# Patient Record
Sex: MALE | Race: WHITE
[De-identification: names, ages, dates, MRNs, and addresses within clinical notes are randomized per-mention and may not be internally consistent; named-entity substitution may affect disease eponyms.]

---

## 2019-01-05 ENCOUNTER — HOSPITAL ENCOUNTER (EMERGENCY)
Dept: HOSPITAL 80 - FED | Age: 39
LOS: 1 days | Discharge: HOME | End: 2019-01-06
Payer: MEDICAID

## 2019-01-05 VITALS — DIASTOLIC BLOOD PRESSURE: 82 MMHG | SYSTOLIC BLOOD PRESSURE: 147 MMHG

## 2019-01-05 DIAGNOSIS — Z59.0: ICD-10-CM

## 2019-01-05 DIAGNOSIS — S90.821A: ICD-10-CM

## 2019-01-05 DIAGNOSIS — S90.822A: Primary | ICD-10-CM

## 2019-01-05 SDOH — ECONOMIC STABILITY - HOUSING INSECURITY: HOMELESSNESS: Z59.0

## 2019-01-05 NOTE — EDPHY
General


Time Seen by Provider: 01/05/19 23:37


Narrative: 





CLINICAL IMPRESSION:  Foot blisters


_________________


ASSESSMENT/PLAN:


 38-year-old homeless male presents to the emergency department complaining of 

by left knee pain x1 month.  Patient is a very poor historian.  He reports no 

injury or trauma.  He has no obvious open wounds.  Distal neurovascular is 

intact.  He has blisters to the bottom of both heels that he is concerned about 

infection.  No evidence of trench foot.  Patient is requesting an antibiotic.  

He is currently sleeping outside.  He is unable to answer my question regarding 

MRSA risk or previous staph infection.  No track marks to suggest underlying IV 

drug abuse and patient is denying this.  He was given Keflex tonight and a take-

home pack.  I encouraged him to follow up with primary care.  A fresh pair of 

socks was provided.  I advised him to let his feet air out during the day limit 

his walking.  Warning signs return to ED outlined and discharge


_________________


DIFFERENTIAL DX:


Differential includes but not limited to acute traumatic injury, infection, 

cellulitis, trench foot, blisters





_________________


CHIEF COMPLAINT:  My feet hurt


_________________


HPI:


38-year-old homeless male presents to the emergency department stating that 

both of his feet have been hurting for 1 month.  Patient reports he has been

"kicked out of the homeless shelter for at least 60 days".  He states that he 

is not doing his chores there but he will not report what this chore is.  He is 

concerned that he has an infected blister on the bottom of his foot.  He claims 

that he is changing his socks daily although his feet and shoes appear 

chronically wet.  Patient states he walks all night.  He does not want to go to 

a warming shelter tonight.  He denies fevers or chills.  There is no obvious 

signs of open wound or trauma. 


_________________


PAST MEDICAL HISTORY: 


Mental health history  See triage summary and nurse notes for addition 

applicable history 





Pertinent Past Surgical History: None reported 





Family History: Noncontributory 





Social History:  Homeless


_________________


REVIEW OF SYSTEMS:


A full 10 point review of systems was negative except for those mentioned in 

HPI. 


_________________


PHYSICAL EXAM:


General Appearance:  Alert, oriented, appropriate, cooperative, NAD, well 

hydrated, non-toxic appearing, VSS, no hypoxia.


Skin: Warm, dry, no rashes, no nodules on palpation.  Blisters noted to the 

bottom of both heels.


Musculoskeletal:  Full range of motion of both lower extremity and feet.  

Distal neurovascular exam intact.  No open wounds


_________________


MEDICAL DECISION MAKING:


Patient was seen independently. Secondary supervising physician at time of 

evaluation was:  Dr Geronimo.


Diagnosis:  Blisters to feet. New, requires workup


Summary: See Assessment and Plan for summary of ED visit 








Patient Progress:  Stable. 





- History


Smoking Status: Former smoker





- Objective


Vital Signs: 


 Initial Vital Signs











Temperature (C)  36.8 C   01/05/19 23:25


 


Heart Rate  89   01/05/19 23:25


 


Respiratory Rate  18   01/05/19 23:25


 


Blood Pressure  147/82 H  01/05/19 23:25


 


O2 Sat (%)  94   01/05/19 23:25








 











O2 Delivery Mode               Room Air














Allergies/Adverse Reactions: 


 





No Known Allergies Allergy (Verified 09/08/16 12:54)


 








Home Medications: 














 Medication  Instructions  Recorded


 


Unobtainable  01/05/19











Medications Given: 


 








Discontinued Medications





Cephalexin (Keflex 500 Mg Prepack#4)  1 btl TAKEHOME EDNOW ONE


   PRN Reason: Protocol


   Stop: 01/05/19 23:55


   Last Admin: 01/06/19 00:00 Dose:  1 btl


Cephalexin HCl (Keflex)  500 mg PO EDNOW ONE


   PRN Reason: Protocol


   Stop: 01/05/19 23:51


   Last Admin: 01/06/19 00:01 Dose:  500 mg








Departure





- Departure


Disposition: Home, Routine, Self-Care


Clinical Impression: 


Blister of foot


Qualifiers:


 Encounter type: initial encounter Laterality: unspecified laterality Qualified 

Code(s): S90.829A - Blister (nonthermal), unspecified foot, initial encounter





Condition: Good


Instructions:  Cephalexin (By mouth), Blister (ED)


Additional Instructions: 


DISCHARGE INSTRUCTIONS FROM YOUR DOCTOR 


Thank you for visiting our emergency department today. Please keep in mind that 

discharge from the emergency department does not mean that there is nothing 

wrong - it simply means that we have not identified an emergency condition that 

requires further evaluation or treatment in the hospital. You should always 

plan to follow up with primary care for re-evaluation of your condition in the 

next 2-3 days. If you have been referred to a specialist, please call as soon 

as possible (today or tomorrow) to schedule your follow up appointment at the 

appropriate time. 





[TAKE ANTIBIOTICS AS DIRECTED.  FOLLOW-UP WITH [facility] TO RECHECK.  

PLEASE CHANGE YOUR SOCKS DAILY.  KEEP YOUR FEET DRY.   A DRY PAIR OF SOCKS WAS 

GIVEN TONIGHT.]





People present with illnesses and injuries in different ways, and it is always 

possible that we have missed something. You may always return for re-evaluation 

if symptoms worsen or if they are not improving or if you develop new/different 

symptoms. 


Again, thank you for choosing our emergency department. We hope that you feel 

better.


Referrals: 


NONE *PRIMARY CARE P,. [Primary Care Provider] - As per Instructions


[facility],. [Clinic] - As per Instructions

## 2019-01-07 ENCOUNTER — HOSPITAL ENCOUNTER (INPATIENT)
Dept: HOSPITAL 80 - FED | Age: 39
LOS: 10 days | Discharge: HOME | DRG: 885 | End: 2019-01-17
Attending: PSYCHIATRY & NEUROLOGY | Admitting: PSYCHIATRY & NEUROLOGY
Payer: COMMERCIAL

## 2019-01-07 DIAGNOSIS — Z59.0: ICD-10-CM

## 2019-01-07 DIAGNOSIS — L98.9: ICD-10-CM

## 2019-01-07 DIAGNOSIS — F25.0: Primary | ICD-10-CM

## 2019-01-07 DIAGNOSIS — T43.506A: ICD-10-CM

## 2019-01-07 LAB — PLATELET # BLD: 265 10^3/UL (ref 150–400)

## 2019-01-07 PROCEDURE — G0480 DRUG TEST DEF 1-7 CLASSES: HCPCS

## 2019-01-07 SDOH — ECONOMIC STABILITY - HOUSING INSECURITY: HOMELESSNESS: Z59.0

## 2019-01-07 NOTE — ASMTTCLDSP
TLC Discharge Disposition

 

Disposition:                  Answers:  Admit                                 

Discharge                     

Concerns/Recommendations:     

Notes:

In consultation with [facility] ED physician, Shelli Rico MD and on-call 

psychiatrist, Dr. Fahad MD, both concurred that pt appears to meet 27-65 criteria requiring 

psychiatric hospitalization as pt appears to be gravely disabled due to a mental illness 

condition. Pt was given the 3N prohibited belongings list while in the ED.

For inpatient                 [facility][her Fahad MD

admission, the following      

psychiatrist agreed to        

accept patient for            

admission to Behavioral       

Health ([facility]):              

 

Date Signed:  01/07/2019 03:11 PM

Electronically Signed By:Rossana Villegas
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
## 2019-01-07 NOTE — EDPHY
H & P


Source: Patient, RN/MD, Old records


Exam Limitations: Clinical condition





- Personal History


Tetanus Vaccine Date: 2016





- Medical/Surgical History


Hx Asthma: No


Hx Chronic Respiratory Disease: No


Hx Diabetes: No


Hx Cardiac Disease: No


Hx Renal Disease: No


Hx Cirrhosis: No


Hx Alcoholism: No


Hx HIV/AIDS: No


Hx Splenectomy or Spleen Trauma: No


Other PMH: PMH: bipolar,.  PSH: unknown





- Social History


Smoking Status: Former smoker


Time Seen by Provider: 01/07/19 09:34


HPI/ROS: 


HPI:  This is a 38-year-old male who presents with 





Chief Complaint:  M1 hold





Location: psych 


Quality:  M1 hold


Duration:  Today


Signs and Symptoms: + auditory hallucinations, + visual hallucinations, no 

suicidal ideation with a plan, no homicidal ideation, + paranoia


Timing:  Acute on chronic


Severity:  Severe


Context:  Patient presents from [facility] on M1 hold for being 

gravely disabled.  Patient has not been taking his medications as prescribed.  

Patient is paranoid, tangential, disorganized.  Patient reports that he has 

been walking"a lot on my feet lately."  Reports that there is changes in the 

skin but denies any actual pain.


Modifying Factors: 





Comment: 








ROS: A comprehensive 10 system review of systems is otherwise negative aside 

from elements mentioned in the history of present illness. 





MEDICAL/SURGICAL/SOCIAL HISTORY: 


Medical history:  Bipolar disease, schizoaffective disorder


Surgical history:  Denies


Social history:  Nonsmoker.  Denies drug, alcohol, tobacco use.  Family history 

noncontributory.











CONSTITUTIONAL: awake and alert, no obvious distress


HEENT: Atraumatic and normocephalic, PERRL, EOMI.  Nares patent; no rhinorrhea;

  no nasal mucosal edema. Tympanic membranes clear. Oropharynx clear, no 

exudate and moist pink mucosa.  Airway patent.  No lymphadenopathy.  No 

meningismus.


Cardiovascular: Normal S1/S2, regular rate, regular rhythm, without murmur rub 

or gallop.


PULMONARY/CHEST:  Symmetrical and nontender. Clear to auscultation bilaterally. 

Good air movement. No accessory muscle usage.


ABDOMEN:  Soft, nondistended, nontender, no rebound, no guarding, no peritoneal 

signs, no masses or organomegaly. No CVAT.


EXTREMITIES:  2/2 pulses, strength 5/5, no deformities, no clubbing, no 

cyanosis or edema.


NEUROLOGICAL: no focal neuro deficits.  GCS 15.


SKIN: Warm and dry, no erythema. no rash.  Good capillary refill. 


PSYCH: Poor eye contact,+  flight of ideas,  tangential disorganized thought 

process, poor insight and judgment, + auditory hallucinations, + visual 

hallucinations, no suicidal ideation with a plan, no homicidal ideation, + 

paranoia





 (Vera Membreno)


Constitutional: 





 Initial Vital Signs











Temperature (C)  36.6 C   01/07/19 09:00


 


Heart Rate  61   01/07/19 09:00


 


Respiratory Rate  19   01/07/19 09:00


 


Blood Pressure  124/77 H  01/07/19 09:00


 


O2 Sat (%)  98   01/07/19 09:00








 











O2 Delivery Mode               Room Air














Allergies/Adverse Reactions: 


 





No Known Allergies Allergy (Verified 09/08/16 12:54)


 








Home Medications: 














 Medication  Instructions  Recorded


 


Unobtainable  01/07/19














Medical Decision Making


ED Course/Re-evaluation: 


Agree with M1 hold as patient is gravely disabled.  Given Zyprexa 5 mg. Labs 

and UDS ordered.


1155:  Labs reviewed and grossly unremarkable.  Urine drug screen negative.  

Medically clear for mental health evaluation.


1245:  Called for mental health evaluation


1315:  Mental health evaluation in progress


1354:  Mental health recommends inpatient psychiatric admission.  Accepted by [address] [place] [address] by Dr. Vásquez. EMTALA completed by myself. 








This patient was seen under the supervision of my secondary supervising 

physician.  I evaluated care for this patient independently.  


 (Vera Membreno)


Differential Diagnosis: 


Differential diagnosis includes but is not limited to major depression, anxiety 

disorder, schizophrenia, bipolar disorder, intoxicant use, suicidal ideation, 

psychosis, skip.


 (Vera Membreno)


Other Provider: 





The patient was evaluated and managed by the Physician Assistant.  My co-

signature indicates that I have reviewed this chart and I agree with the 

findings and plan of care as documented.  I am the secondary supervising 

physician. (Shelli Rico)





- Data Points


Laboratory Results: 





 Laboratory Results





 01/07/19 09:30 





 01/07/19 09:30 








Medications Given: 





 





Risperidone (Risperdal-M)  3 mg SL HS NIMESH


   Stop: 07/07/19 20:59


   Last Admin: 01/12/19 20:35 Dose:  3 mg





Discontinued Medications





Lorazepam (Ativan Injection)  2 mg IM ONCE ONE


   Stop: 01/07/19 14:14


   Last Admin: 01/07/19 14:28 Dose:  2 mg


Olanzapine (Zyprexa Zydis)  5 mg PO EDNOW ONE


   Stop: 01/07/19 09:39


   Last Admin: 01/07/19 10:21 Dose:  5 mg


Risperidone (Risperdal-M)  2 mg SL HS [facility]


   Stop: 07/07/19 20:59


   Last Admin: 01/10/19 20:48 Dose:  2 mg








Departure





- Departure


Disposition: [facility] IP


Clinical Impression: 


 Paranoid schizophrenia





Condition: Fair

## 2019-01-08 RX ADMIN — RISPERIDONE SCH MG: 2 TABLET, ORALLY DISINTEGRATING ORAL at 19:25

## 2019-01-08 RX ADMIN — RISPERIDONE SCH: 2 TABLET, ORALLY DISINTEGRATING ORAL at 19:32
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
## 2019-01-08 NOTE — PDMN
Medical Necessity


Medical necessity: Pt meets inpt criteria per MD order and [facility], 

Schizophrenia Spectrum Disorders, Adult: Inpatient Care. 37 y/o, 

schizoaffective disorder, bipolar type, on M1 hold due to being gravely 

disabled due to mental illness, requires inpt psychiatric hospitalization.

## 2019-01-08 NOTE — ASMTBHMTP
Master Treatment Plan

 

Master Treatment Plan         Answers:  Mood Instability with                 

for:                                    Psychosis                             

Date:                         01/08/2019

Diagnosis on Admission:       Schizoaffective Disorder, Bipolar Type 295.70

Expected length of stay:      3-5 Days

Reason for admission:         

Notes:

Per [facility] Evaluation - Pt. is a 38 year old male who presented to [facility] ED on an M1 from the [facility] last 

night by EMS. Pt. was brought to the [facility] by [employer] after his father called to do a welfare check on 

pt. Per Evan at [facility], they were unable to complete an assessment or obtain a UDS due to pt's 

symptoms Evan stated pt. appeared, disorganized, paranoid and off his medications. Pt. told this 

writer that he did not want to talk and when asked questions about his history, pt responded, "I 

don't want to tell you that."



Per father Jaleel, "pt has been incomprehensible for years. Sometimes he is more lucid." Father 

stated sometimes he will come to his house, take a rake and start slamming on the windows. Jaleel 


stated, yesterday when he came to their house, "His jaw and hand was shaking. I have never seen 

that." Jaleel stated when pt was in his 20's he was functioning well, had his own company and 

"was a normal developing kid." Dad stated tried methamphetamine in 20's, "and he changed." Father 

states he has power of . He state he believes pt's mother may also have power of  

but he is not sure. 

Patient's stated              

presenting problems:          

Notes:

Patient refused to meet with CC

Patient's goals for           

treatment:                    

Notes:

Patient refused to meet with CC

Patient's strengths:          

Notes:

Patient refused to meet with CC

Identify supports outside     

of hospital:                  

Notes:

Patient refused to meet with CC

Initial disposition           

plan/considerations:          

Notes:

Patient refused to meet with CC

Master Treatment Plan Required Signatures

 

Psychiatrist signature:       Answers:  Psychiatrist: ______________________________

RN on-shift signature:        Answers:  RN: ____________________________________

Patient signature:            Answers:  Patient: ____________________________________

 

Date Signed:  01/08/2019 09:03 AM

Electronically Signed By:Rebecca Hernandez

## 2019-01-08 NOTE — BCON
INTERNAL MEDICINE CONSULTATION.



DATE OF CONSULTATION:  01/08/2019



REFERRING PHYSICIAN:  David Vásquez MD





REASON FOR REFERRAL:  Medical clearance for inpatient behavioral health stay.



HISTORY OF PRESENT ILLNESS:  This person came to the emergency department on an 
M1 hold from [facility].  He had not been taking his psychiatric 
medications.  He reported that he had been doing a lot of recent walking.  He 
was evaluated by the mental health team and admitted for further psychiatric 
care. 



He currently is without complaints.  He was seen 3 days ago in the emergency 
department with a complaint of foot pain and blisters.  At that time he was 
issued cephalexin to cover several days.



PAST MEDICAL HISTORY:  

1.  Mental health issues with diagnosis of bipolar disorder with skip.

2.  Left wrist fracture.



PAST SURGICAL HISTORY:  I do not believe he has had any surgeries.  He is 
reticent to reveal more of his medical history.



MEDICATIONS:  He had a recent prescription for Risperdal Consta every 2 weeks 
and he was just prescribed cephalexin.



ALLERGIES:  There are no known drug allergies.



SOCIAL HISTORY:  He is homeless.  Per the medical record, he is a former smoker 
and former methamphetamine abuser.  He had a negative drug screen done in the 
emergency department.



FAMILY HISTORY:  Noncontributory.



REVIEW OF SYSTEMS:  He denies pain, cough, dyspnea, weight change, nausea, 
vomiting, constipation, or diarrhea, and otherwise to the extent that he was 
cooperative, a 10-point review of systems was negative.



PHYSICAL EXAM:  VITAL SIGNS:  Blood pressure is 105/63, heart rate is 65, 
respiratory rate is 12.  Oxygen saturation is 98% on room air, temperature is 
36.6 degrees centigrade.  His weight is 63.5 kg for a body mass index of 21.9.  
GENERAL:  This is a well-nourished, well-developed man initially in bed but 
arises to greet the examiner.  He has no shirt.  He requests to continue the 
exam in the johnson.  He puts on a sweater.  He is cooperative and in no acute 
distress. HEENT extraocular movements are intact.  Mucous membranes are moist.  
Dentition is in good condition.  NECK:  Supple.  He was noncompliant with 
cardiac, pulmonary or abdominal exams. EXTREMITIES: There is no cyanosis, 
clubbing, or edema. SKIN: The right plantar forefoot distal to the 1st 
metatarsal head and proximal to the great toe has approximately 1 x 2 cm 
blister. It is not deroofed and is intact.  There is no erythema.  The left 
foot plantar forefoot has approximately a 3 x 3 cm soft and dusky area with 
similar findings on his heel.  These are nontender.  There is no erythema and 
no swelling.  NEUROLOGIC:  He is alert.  He was not compliant with testing 
orientation.  He appears to have no weakness and his gait is normal.  He was 
not compliant with further neurologic testing. 

Cranial nerves were grossly intact by observation.



LABORATORY STUDIES:  From the emergency department, CBC was overall normal.  He 
had a slight decrement of absolute lymphocytes and absolute basophils of no 
clinical significance.  Serum chemistry revealed normal renal function and 
electrolytes.  Hemoglobin A1c was normal at 5.6.  Lipid panel was quite benign 
with a low triglyceride of 39, a low cholesterol at 122, a low LDL of 51 and a 
normal HDL at 63.  Toxicology screen in the serum and the urine were negative 
for ethyl alcohol or substances of abuse.



ASSESSMENT/RECOMMENDATIONS:  

1.  Mental health issues pending further evaluation and management per 
Psychiatry and the mental health team.

2.  Blister to the left foot.  No intervention is necessary.  Expect this to 
heal spontaneously.

3.  Right foot with possible frostbite versus deep tissue injury.  He is 
ambulating without pain and was nontender on exam.  Advise monitoring with 
serial exams to evaluate for any rosemarie necrosis.  If this is developing, then 
he should have a referral to the wound clinic upon his discharge from inpatient 
behavioral health. 



I see no medical contraindications to this patient's continued stay on the 
inpatient behavioral health unit or to any psychiatric medications or 
procedures. 



Thank you very much for including me in the care of this patient and please do 
not hesitate to contact me or the hospitalist service should there be need for 
further medical evaluation.





Job #:  [id number]

MTDD

## 2019-01-09 RX ADMIN — RISPERIDONE SCH MG: 2 TABLET, ORALLY DISINTEGRATING ORAL at 23:11

## 2019-01-09 RX ADMIN — RISPERIDONE SCH: 2 TABLET, ORALLY DISINTEGRATING ORAL at 22:45

## 2019-01-10 RX ADMIN — RISPERIDONE SCH MG: 2 TABLET, ORALLY DISINTEGRATING ORAL at 20:48

## 2019-01-10 NOTE — SOAPPROG
SOAP Progress Note


Assessment/Plan: 


Assessment:


























Plan:





01/09/19 13:56


Mood/psychosis:  Remains quite ill.  Will continue to offer Risperdal PO.  If 

he continues to refuse, will consider petition for involuntary meds. 


01/10/19 16:51


Mood/psychosis:  Slight improvement with Risperdal.  [facility].  Place on [facility].





Subjective: 





Pt seen, discussed with staff.  Took Risperdal last night.  Perhaps calmer 

today.  Remains disorganized, agitated at times, intrusive, paranoid.


Objective: 





 Vital Signs











Temp Pulse Resp BP Pulse Ox


 


 36.6 C   65   12   105/63   98 


 


 01/07/19 16:50  01/07/19 16:50  01/07/19 16:50  01/07/19 16:50  01/07/19 16:50








MSE:  Moderately agitated, more interactive.  Affect is elevated, expansive.  

Mood is "great."  TP is disorganized.  TC reveals paranoid thoughts, possible AH

's.





- Time Spent With Patient


Time Spent With Patient: 





15"





ICD10 Worksheet


Patient Problems: 


 Problems











Problem Status Onset


 


Paranoid schizophrenia Acute  


 


Navicular fracture Acute  


 


Schizophrenia, disorganized Acute

## 2019-01-11 RX ADMIN — RISPERIDONE SCH MG: 2 TABLET, ORALLY DISINTEGRATING ORAL at 20:55

## 2019-01-11 NOTE — ASMTCMCOM
CM Note

 

CM Note                       

Notes:

The client refused to complete an LUL for [facility] or discuss possible follow up care with this writer. 

 

Date Signed:  01/11/2019 03:26 PM

Electronically Signed By:Emily Smart

## 2019-01-12 RX ADMIN — RISPERIDONE SCH MG: 2 TABLET, ORALLY DISINTEGRATING ORAL at 20:35

## 2019-01-12 NOTE — SOAPPROG
SOAP Progress Note


Assessment/Plan: 


Assessment:








Per Dr. Vásquez's note:


01/09/19 13:56


Mood/psychosis:  Remains quite ill.  Will continue to offer Risperdal PO.  If 

he continues to refuse, will consider petition for involuntary meds. 


01/10/19 16:51


Mood/psychosis:  Slight improvement with Risperdal.  [facility]M.  Place on [facility].





Subjective: 





Pt seen, discussed with staff.  Took Risperdal last night.  Perhaps calmer 

today.  Remains disorganized, agitated at times, intrusive, paranoid.








Plan:


01/12/19 17:32


1. Patient took Risperdal again last night. More pleasant and polite with 

staff. 


2. Patient still has paranoid delusions. He told CC that he had "recent issues 

with the [employer]" but said he would only talk about them with "my ." 


3. Brighter, more cheerful afffect, attending groups. 


4. CCM


5. STC


Subjective: 


Patient pacing in halls smiling with more cheerful affect. He calls each staff 

by their name and is very polite. He does not present as agitated or in any 

distress. However, he remains quite delusional, insisting he has "issues with 

the FBI" and needs to discuss his problem, but only "with my ." 





Objective: 





 Vital Signs











Temp Pulse Resp BP Pulse Ox


 


 36.6 C   65   12   105/63   98 


 


 01/07/19 16:50  01/07/19 16:50  01/07/19 16:50  01/07/19 16:50  01/07/19 16:50








MSE: Affect: Brighter, more cheerful  Mood: "OK"  TP: Tangential, loose  TC: 

Denies any SI/HI  Insight/Judgment: Poor





- Time Spent With Patient


Time Spent With Patient: 


15"








- Pending Discharge


Pending Discharge Within 24 Hours: No


Pending Discharge Within 48 Hours: No





ICD10 Worksheet


Patient Problems: 


 Problems











Problem Status Onset


 


Paranoid schizophrenia Acute  


 


Navicular fracture Acute  


 


Schizophrenia, disorganized Acute
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
## 2019-01-13 VITALS — DIASTOLIC BLOOD PRESSURE: 58 MMHG | SYSTOLIC BLOOD PRESSURE: 99 MMHG

## 2019-01-13 RX ADMIN — RISPERIDONE SCH MG: 2 TABLET, ORALLY DISINTEGRATING ORAL at 20:30

## 2019-01-13 NOTE — ASMTCMCOM
CM Note

 

CM Note                       

Notes:

Pt reports feeling "good". Pt. stated he "slept in 310". Pt. reports eating well, adding "good food 


here". Pt. stated he is "getting used to taking medications before going to sleep". Pt. reports 

attending groups and enjoying the Mindfulness group today. Pt. stated "public school system really 

liked what I wrote about not smoking". 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 Pt. stated his goal is to "get readers to make a decision about what they 

read". Pt. stated he is thinking about going to [facility]. CC explained how [facility] is not 

an option. CC asked pt. about his plans after discharge, pt stated "haven't made plan yet". CC 

encouraged pt. to think about different discharge plan options to discuss in the treatment team 

meeting on Monday. Pt. stated he "can stay here longer or leave Monday". 



Pt. presents as alert, elevated, starting eye contact, somewhat disorganized, and mostly 

cooperative. Staff report pt. sleeping 8 hours and being medication compliant. 

 

Date Signed:  01/13/2019 02:33 PM

Electronically Signed By:Rebecca Hernandez

## 2019-01-13 NOTE — SOAPPROG
SOAP Progress Note


Assessment/Plan: 


Assessment:








Per Dr. Vásquez's note:


01/09/19 13:56


Mood/psychosis:  Remains quite ill.  Will continue to offer Risperdal PO.  If 

he continues to refuse, will consider petition for involuntary meds. 


01/10/19 16:51


Mood/psychosis:  Slight improvement with Risperdal.  CCM.  Place on [facility].





Subjective: 





Pt seen, discussed with staff.  Took Risperdal last night.  Perhaps calmer 

today.  Remains disorganized, agitated at times, intrusive, paranoid.








Plan:


01/12/19 17:32


1. Patient took Risperdal again last night. More pleasant and polite with 

staff. 


2. Patient still has paranoid delusions. He told CC that he had "recent issues 

with the FBI" but said he would only talk about them with "my ." 


3. Brighter, more cheerful afffect, attending groups. 


4. [facility]


5. ST





PLAN:


01/13/19 16:50


1. Patient continues to take Risperdal as prescribed.


2. Definitely much less irritable than at admission, but remains delusional.


3. [facility]


4. ST


Subjective: 


Patient alternates between wearing sweater and a T-shirt. He is much less 

irritable than at admission, and is more appropriate when making requests. 

However, much of what he says makes very little sense. For instance, while MD 

was talking to patient, he asked RN if she had "thought about what I asked 

you..." but did not specify what he meant. Then he said, "well, it's a good 

thing it's Sunday." Then there was a long pause and MD thought patient had 

forgotten the conversation. But then he said, "I've got this all written down 

somewhere." Then patient abruptly walked away. Later, MD overheard patient 

yelling at TV. He was getting upset about news coverage and grabbed the remote 

so he could change the channel. 





Objective: 





 Vital Signs











Temp Pulse Resp BP Pulse Ox


 


 36.5 C   61   18   99/58 L  92 


 


 01/13/19 06:00  01/13/19 06:00  01/13/19 06:00  01/13/19 06:00  01/13/19 06:00








MSE: Affect: Labile  Mood: "OK"  TP: Disorganized, thought blocking, illogical  

TC: Denies SI/HI, paranoid  Insight/Judgment: Impaired





- Time Spent With Patient


Time Spent With Patient: 


15"








- Pending Discharge


Pending Discharge Within 24 Hours: No


Pending Discharge Within 48 Hours: No





ICD10 Worksheet


Patient Problems: 


 Problems











Problem Status Onset


 


Paranoid schizophrenia Acute  


 


Navicular fracture Acute  


 


Schizophrenia, disorganized Acute

## 2019-01-14 RX ADMIN — RISPERIDONE SCH MG: 2 TABLET, ORALLY DISINTEGRATING ORAL at 20:36

## 2019-01-14 NOTE — SOAPPROG
SOAP Progress Note


Assessment/Plan: 


Assessment:


























Plan:





01/09/19 13:56


Mood/psychosis:  Remains quite ill.  Will continue to offer Risperdal PO.  If 

he continues to refuse, will consider petition for involuntary meds. 


01/10/19 16:51


Mood/psychosis:  Slight improvement with Risperdal.  [facility]M.  Place on [facility].





01/14/19 16:01


Mood/psychosis:  Continued improvement.  CCM.  Need to finalize d/c plan.  Will 

ask father to participate in family meeting.


Subjective: 





Pt seen, discussed with staff, chart reviewed.  He continues to improve with 

more appropriate interactions, less irritability.  Compliant with meds for past 

five nights.  Clear correlation to improved behaviors.  Notes no SE's.  Sleep 

much better.


Objective: 





 Vital Signs











Temp Pulse Resp BP Pulse Ox


 


 36.5 C   61   18   99/58 L  92 


 


 01/13/19 06:00  01/13/19 06:00  01/13/19 06:00  01/13/19 06:00  01/13/19 06:00














- Time Spent With Patient


Time Spent With Patient: 





15"





ICD10 Worksheet


Patient Problems: 


 Problems











Problem Status Onset


 


Paranoid schizophrenia Acute  


 


Navicular fracture Acute  


 


Schizophrenia, disorganized Acute

## 2019-01-15 RX ADMIN — RISPERIDONE SCH MG: 2 TABLET, ORALLY DISINTEGRATING ORAL at 20:14

## 2019-01-15 NOTE — SOAPPROG
SOAP Progress Note


Assessment/Plan: 


Assessment:


























Plan:





01/09/19 13:56


Mood/psychosis:  Remains quite ill.  Will continue to offer Risperdal PO.  If 

he continues to refuse, will consider petition for involuntary meds. 


01/10/19 16:51


Mood/psychosis:  Slight improvement with Risperdal.  [facility].  Place on [facility].





01/14/19 16:01


Mood/psychosis:  Continued improvement.  CCM.  Need to finalize d/c plan.  Will 

ask father to participate in family meeting.


01/15/19 15:05


Mood/psychosis:  Doing well.  Will CCM, finalize d/c plan.


Subjective: 





Pt seen, discussed with staff.  Upbeat and pleasant today.  Appropriately 

interactive.  Discussed the likelihood of d/c this week and he states he is 

confident he can care for himself "on the street."  He is compliant with all 

meds and therapies.  Offers no c/o's.


Objective: 





 Vital Signs











Temp Pulse Resp BP Pulse Ox


 


 36.5 C   61   18   99/58 L  92 


 


 01/13/19 06:00  01/13/19 06:00  01/13/19 06:00  01/13/19 06:00  01/13/19 06:00














- Time Spent With Patient


Time Spent With Patient: 





15"





ICD10 Worksheet


Patient Problems: 


 Problems











Problem Status Onset


 


Paranoid schizophrenia Acute  


 


Navicular fracture Acute  


 


Schizophrenia, disorganized Acute

## 2019-01-15 NOTE — ASMTCMCOM
CM Note

 

CM Note                       

Notes:

CC confirmed family appt. for tomorrow 1/16 at 11:30am with [facility], relayed to provider. 

 

Date Signed:  01/15/2019 11:23 AM

Electronically Signed By:Thanh Blakely

## 2019-01-16 RX ADMIN — RISPERIDONE SCH MG: 2 TABLET, ORALLY DISINTEGRATING ORAL at 20:40

## 2019-01-17 NOTE — ASMTBHDC
Notes

 

Note:                         

Notes:

CC was able to confirm client's discharge follow up appts:



Follow up with:



 

[facility]

[address] [place] [address], [facility] [place]

Phone: [phone] 



Next Appt: Thursday, January 17th (01/17/19) at 11:30am with Franky at the [facility].**



***Given three days of medications & additional clothes***

 

Date Signed:  01/17/2019 06:47 AM

Electronically Signed By:Thanh Blakely

## 2019-01-22 NOTE — BDS
REASON FOR ADMISSION:  The patient is a 38-year-old  male who was brought to the emergency d
[facility] from the walk-in clinic by [employer] after his father called for them to do a welfare 
check.  He had apparently been off his medications and become more disorganized and paranoid.  He is 
treated for chronic schizophrenia and has history of treatment nonadherence.  A full description of adilia starkey events preceding admission can be found in his admission history dated 01/07/2019.



ADMITTING DIAGNOSES:  Schizoaffective disorder, bipolar type, chronic with acute exacerbation; chroni
c illness; recurrent illness; treatment noncompliance; homelessness; and lack of supports.



ADMITTING PHYSICAL EXAMINATION:  Performed by Dr. Kirby Jimenez revealed some deep tissue injury of
 his right foot that was nontender and some blisters on his left foot.



ADMITTING LABORATORY:  CBC was normal.  Serum chemistries were normal.  Lipid profile showed no eleva
tions.  Hemoglobin A1c was normal at 5.6.  Urine drug screen was negative for all substances.



HOSPITAL COURSE:  Patient was admitted to behavioral health services inpatient unit on an M1 hold.  SUMI castano was generally cooperative, interactive, though was very disorganized and paranoid.  He refused to s
it down and participate in any interview with myself or staff or participate in treatment planning.  
I was able to walk with him and talk with him numerous times today in order to obtain adequate inform
ation to proceed.  He was initially resistant to any medications, but ultimately I encouraged him to 
try Risperdal which has apparently been helpful to him in the past.  This was started at 2 mg daily a
nd then increased to 3 mg.  He tolerated this well with no side effects.  Once he started the medicat
ion, he improved considerably.  His organization of his thoughts was better and he was less paranoid.
 



The patient was seen with his father in a discharge planning meeting on the day prior to discharge.  
We were able to outline his treatment plan and his father voiced his support for him.  The patient wa
s resigned to remaining homeless as his father was unable to assist him any further with housing and 
he had no other supports.  His father also reiterated some boundaries on coming to his place of busin
ess during the day time, etc.  The patient was agreeable to this.  Patient was very guarded and paran
oid toward his father and would not have likely accepted any help from him even if it was possible.  
The patient was, however, cordial and respectful to staff and to his father.



CONDITION AT DISCHARGE:  Stable.  He is cooperative and compliant with medications, saying that he wi
shed to continue it.  He was displaying no irritability or aggression or thoughts of suicide, homicid
e, or violence.



DISCHARGE MEDICATIONS:  Risperdal 3 mg p.o. daily.



DISCHARGE DIAGNOSES:  Schizoaffective disorder, bipolar type, chronic with acute exacerbation; chroni
c illness; recurrent illness; treatment non-adherence; homelessness.



DISPOSITION:  Patient left the hospital of his own accord to go to his outpatient appointment at [facility]. 



Patient was given written instructions as to the dates and times of his followup appointments. 



The patient's attitude at time of discharge was positive. 



The patient was converted to a voluntary status at the expiration of his M1 hold. 



There are no pending labs or studies at the time of discharge. 



Patient was full code throughout his stay. 



Patient was administered nicotine, alcohol, cannabis, and metabolic screenings and was unwilling to f
ollow up with smoking cessation or potential treatments for any cannabis use at the time of discharge
.





Job #:  [id number]

## 2024-12-31 NOTE — SOAPPROG
Kaci Tafoya is a 59 year old female presenting to the walk-in clinic today alone for c/o  sinus drainage, productive cough, chest congestion and bilateral ear discomfort. Symptoms started over 2 weeks ago    Denies fevers, chills, SOB or N/V/D.     Treatment tried prior to visit: Mucinex, benadryl  and OTC cough/cold    Swabs/Specimens collected during rooming process:  None. Viral testing declined     Work, School or  note needed: No    New vs Established: Established    Patient would like communication of their results via:    NewsBreak     SOAP Progress Note


Assessment/Plan: 


Assessment:


























Plan:





01/09/19 13:56


Mood/psychosis:  Remains quite ill.  Will continue to offer Risperdal PO.  If 

he continues to refuse, will consider petition for involuntary meds. 


Subjective: 





Pt seen, discussed with staff. Interviewed in Treatment Team meeting.  He 

remains disorganized, delusional. Unable to communicate in a meaningful way 

with team.  Spends 15 minutes interviewing team members, making inappropriate 

remarks about their appearance.  Unable to review treatment plan or meds except 

to say that he doesn't want to take any medications.


Objective: 





 Vital Signs











Temp Pulse Resp BP Pulse Ox


 


 36.6 C   65   12   105/63   98 


 


 01/07/19 16:50  01/07/19 16:50  01/07/19 16:50  01/07/19 16:50  01/07/19 16:50














- Time Spent With Patient


Time Spent With Patient: 





25"





ICD10 Worksheet


Patient Problems: 


 Problems











Problem Status Onset


 


Paranoid schizophrenia Acute  


 


Navicular fracture Acute  


 


Schizophrenia, disorganized Acute